# Patient Record
Sex: FEMALE | Race: WHITE | NOT HISPANIC OR LATINO | Employment: FULL TIME | URBAN - METROPOLITAN AREA
[De-identification: names, ages, dates, MRNs, and addresses within clinical notes are randomized per-mention and may not be internally consistent; named-entity substitution may affect disease eponyms.]

---
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
## 2019-11-23 NOTE — PROGRESS NOTES
Cardiac Electrophysiology Outpatient Consult Note            [facility]     Consult Note     Michell Lynch  [id number]  11/25/2019  [unfilled]  [unfilled]    Primary Care Physician: Ananth Vo MD    Referred By: Archana Walsh MD    Subjective     Chief Complaint:   Chief Complaint   Patient presents with   • Loss of Consciousness   • Dizziness     Problem list:    1. HTN  2. IBS    History of Present Illness:   Ms. Michell Lynch is a 19 y.o. female who presents to my electrophysiology clinic for evaluation of syncope.  She has had several episodes in the last 6 to 7 months.  She numbers them at approximately 8.  All of the episodes she knows she is going to lose consciousness before she does.  She is never had any injury.  She feels completely wiped out and groggy for several hours afterwards.    She denies nausea vomiting fevers or chills.  She does admit and endorses significant weight gain due to inactivity and also increased caloric intake.    She does not smoke    She has had no palpitations with any of her events..    Review of Systems:   Review of Systems:   Constitutional: No fevers or chills, no recent weight gain or weight loss or fatigue  Eyes: No visual loss, blurred vision, double vision, yellow sclerae.  ENT: No headaches, hearing loss, vertigo, congestion or sore throat.   Cardiovascular: Per HPI  Respiratory: No cough or wheezing, no sputum production, no hematemesis   Gastrointestinal: No abdominal pain, no nausea, vomiting, constipation, diarrhea, melena.   Genitourinary: No dysuria, hematuria or increased frequency.  Musculoskeletal:  No gait disturbance, weakness or joint pain or stiffness  Integumentary: No rashes, urticaria, ulcers or sores.   Neurological: No headache, dizziness, syncope, paralysis, ataxia, no prior CVA/TIA  Psychiatric: No anxiety, or depression  Endocrine: No diaphoresis, cold or heat intolerance. No polyuria or  "polydipsia.   Hematologic/Lymphatic: No anemia, abnormal bruising or bleeding. No history of DVT/PE.         Past Medical History:   Past Medical History:   Diagnosis Date   • Anxiety    • Anxiety    • Depressed    • Depression    • HPV in female    • Hypertension    • IBS (irritable bowel syndrome)    • IBS (irritable bowel syndrome)        Past Surgical History:   Past Surgical History:   Procedure Laterality Date   •  SECTION     • CHOLECYSTECTOMY         Family History:   Family History   Problem Relation Age of Onset   • Mitral valve prolapse Mother    • No Known Problems Father    • Anxiety disorder Sister    • Depression Sister    • Hypertension Other    • Anxiety disorder Sister    • Depression Sister    • Asthma Sister        Social History:   Social History     Socioeconomic History   • Marital status: Single     Spouse name: Not on file   • Number of children: Not on file   • Years of education: Not on file   • Highest education level: Not on file   Tobacco Use   • Smoking status: Current Every Day Smoker     Types: Cigarettes, Electronic Cigarette     Last attempt to quit: 2013     Years since quittin.0   • Smokeless tobacco: Never Used   • Tobacco comment: just smoking vape no cigarettes   Substance and Sexual Activity   • Alcohol use: Yes     Comment: seldom   • Drug use: No   • Sexual activity: Defer       Medications:   No current outpatient medications on file.    Allergies:   Allergies   Allergen Reactions   • Sulfur GI Intolerance       Objective     Physical Exam:  Vital Signs:   Vitals:    19 0910   BP: 124/88   BP Location: Left arm   Patient Position: Sitting   Pulse: 93   Weight: 122 kg (269 lb)   Height: 170.2 cm (67\")     GEN: Well nourished, well-developed, no acute distress  HEENT: Normocephalic, atraumatic, moist mucous membranes  NECK: Supple, no JVD, no thyromegaly, no lymphadenopathy  CARD: Regular rate and rhythm, normal S1 & S2 are present.  No murmur, gallop " or rubs are appreciated.  LUNGS: Clear to auscultation bilateraly, normal respiratory effort  ABDOMEN: Soft, nontender, normal bowel sounds  EXTREMITIES: No gross deformities, no clubbing, cyanosis.  Edema none.  SKIN: Warm, dry  NEURO: No focal deficits, alert and oriented x 3  PSYCHIATRIC: Normal affect and mood, appropriate use of semantics and logic.        Lab Results   Component Value Date    GLUCOSE 91 07/13/2017    GLUCOSE 91 07/13/2017    CALCIUM 9.9 07/13/2017    CALCIUM 9.7 07/13/2017     07/13/2017     07/13/2017    K 4.1 07/13/2017    K 4.0 07/13/2017    CO2 27.0 07/13/2017    CO2 24.0 07/13/2017     07/13/2017     (H) 07/13/2017    BUN 10 07/13/2017    BUN 11 07/13/2017    CREATININE 0.70 07/13/2017    CREATININE 0.70 07/13/2017    EGFRIFNONA 108 07/13/2017    EGFRIFNONA 108 07/13/2017    BCR 14.3 07/13/2017    BCR 15.7 07/13/2017    ANIONGAP 4.0 07/13/2017    ANIONGAP 7.0 07/13/2017     Lab Results   Component Value Date    WBC 8.45 07/13/2017    HGB 13.1 07/13/2017    HCT 39.3 07/13/2017    MCV 89.1 07/13/2017     07/13/2017     No results found for: INR, PROTIME  No results found for: TSH, Y8RABWZ, V3IZGTK, THYROIDAB    Cardiac Testing:  .all cardiac testing.    I personally viewed and interpreted the patient's EKG/Telemetry/lab data      ECG 12 Lead  Date/Time: 11/25/2019 9:40 AM  Performed by: Chapincito Madison DO  Authorized by: Chapincito Madison DO   Comparison: not compared with previous ECG   Rhythm: sinus rhythm    Clinical impression: normal ECG            Tobacco Cessation: N/A  Obstructive Sleep Apnea Screening: N/A    Assessment & Plan    This patient has classic vasovagal syncope.  She has a structurally normal heart by echocardiogram and normal ambulatory monitor during symptoms.  She has a normal EKG and a normal physical exam.  She has multiple clear stressors which have contributed to this including sleeping 3 to 4 hours per night, eating very infrequently,  chronically being dehydrated, never exercising, having a lot of home stressors including a significant other who works during the night and a 14-month-old at home.  She knows that she is significantly overweight.  She is motivated to lose weight.  She also has been diagnosed with systemic hypertension and this is undoubtedly related to her weight as well.    Regarding her vasovagal syncope I have encouraged her to think of herself as having a normal heart because indeed she does.  She requires no additional testing or work-up for this.  I have prescribed her the following lifestyle changes which will be critical for her improvement:    1.  Significant weight loss.    2.  Improve sleep hygiene.    3.  Smaller more frequent meals with less carbohydrates.    4.  Exercise every day.    5.  Decrease caffeine intake specifically soda and tea.    6.  Increase salt and water intake.    I will not schedule follow-up in my arrhythmia clinic.  She can follow-up with her primary care physician.    Obesity Counseling:    I discussed with the patient that they are obese.  We discussed that obesity is a significant risk factor for heart disease, hypertension, diabetes, other forms of health problems and indeed premature death.  We discussed that it is critical that the patient loose weight to minimize their risk of excess morbidity and mortality.  We further discussed various options regarding weight loss strategies including dietary caloric restriction, exercise, referral to a dietitian and possibly also bariatric surgical options   (which are appropriate for some but not all patients ). We spent over 10 minutes discussing weight loss options. They voiced a clear understanding of these medical facts.  They voice a clear understanding of my medical advice that they endeavor to loose weight as their obesity is adversely affecting their health.      Michell was seen today for loss of consciousness and dizziness.    Diagnoses and all  orders for this visit:    Syncope and collapse        Follow Up:     Thank you for allowing me to participate in the care of your patient. Please to not hesitate to contact me with additional questions or concerns.        Chapincito Madison DO, FACC, [facility]  Cardiac Electrophysiologist

## 2019-11-25 ENCOUNTER — CONSULT (OUTPATIENT)
Dept: CARDIOLOGY | Facility: CLINIC | Age: 19
End: 2019-11-25

## 2019-11-25 VITALS
HEIGHT: 67 IN | WEIGHT: 269 LBS | DIASTOLIC BLOOD PRESSURE: 88 MMHG | HEART RATE: 93 BPM | BODY MASS INDEX: 42.22 KG/M2 | SYSTOLIC BLOOD PRESSURE: 124 MMHG

## 2019-11-25 DIAGNOSIS — R55 SYNCOPE AND COLLAPSE: Primary | ICD-10-CM

## 2019-11-25 PROCEDURE — 93000 ELECTROCARDIOGRAM COMPLETE: CPT | Performed by: INTERNAL MEDICINE

## 2019-11-25 PROCEDURE — 99203 OFFICE O/P NEW LOW 30 MIN: CPT | Performed by: INTERNAL MEDICINE

## 2021-07-15 ENCOUNTER — HOSPITAL ENCOUNTER (EMERGENCY)
Facility: HOSPITAL | Age: 21
Discharge: HOME | End: 2021-07-15
Attending: EMERGENCY MEDICINE | Admitting: EMERGENCY MEDICINE

## 2021-07-15 ENCOUNTER — APPOINTMENT (OUTPATIENT)
Dept: CT IMAGING | Facility: HOSPITAL | Age: 21
End: 2021-07-15

## 2021-07-15 VITALS
OXYGEN SATURATION: 98 % | SYSTOLIC BLOOD PRESSURE: 125 MMHG | DIASTOLIC BLOOD PRESSURE: 97 MMHG | TEMPERATURE: 97.8 F | WEIGHT: 265 LBS | HEIGHT: 67 IN | BODY MASS INDEX: 41.59 KG/M2 | HEART RATE: 120 BPM | RESPIRATION RATE: 16 BRPM

## 2021-07-15 DIAGNOSIS — M54.2 NECK PAIN: Primary | ICD-10-CM

## 2021-07-15 PROCEDURE — 99282 EMERGENCY DEPT VISIT SF MDM: CPT

## 2021-07-15 PROCEDURE — 72125 CT NECK SPINE W/O DYE: CPT

## 2021-07-15 RX ORDER — CYCLOBENZAPRINE HCL 10 MG
10 TABLET ORAL 3 TIMES DAILY PRN
Qty: 21 TABLET | Refills: 0 | OUTPATIENT
Start: 2021-07-15 | End: 2022-10-28

## 2021-07-15 RX ORDER — ETODOLAC 200 MG/1
200 CAPSULE ORAL EVERY 8 HOURS
Qty: 15 CAPSULE | Refills: 0 | Status: SHIPPED | OUTPATIENT
Start: 2021-07-15 | End: 2022-11-16

## 2021-07-15 NOTE — ED PROVIDER NOTES
Subjective   Chief Complaint: Neck pain  History of Present Illness: 21-year-old female comes in for evaluation of above complaint.  She says she was getting up out of bed at 8:00 this morning using her left arm to push himself up and felt a pop and pain in her neck.  She complains of diffuse cervical pain.  Pain with any range of motion.  No numbness or tingling or weakness in her bilateral upper extremities.  Onset: 8:00 AM  Timing: Single inciting injury with ongoing symptoms  Exacerbating / Alleviating factors: Worse with any movement of the neck and palpation of the paraspinous musculature  Associated symptoms: None      Nurses Notes reviewed and agree, including vitals, allergies, social history and prior medical history.          Review of Systems   Constitutional: Negative.    HENT: Negative.    Eyes: Negative.    Respiratory: Negative.    Cardiovascular: Negative.    Gastrointestinal: Negative.    Genitourinary: Negative.    Musculoskeletal: Positive for neck pain.   Skin: Negative.    Neurological: Negative.    Psychiatric/Behavioral: Negative.        Past Medical History:   Diagnosis Date   • Anxiety    • Anxiety    • Depressed    • Depression    • HPV in female    • Hypertension    • IBS (irritable bowel syndrome)    • IBS (irritable bowel syndrome)        Allergies   Allergen Reactions   • Latex Itching     Skin redness and irritation   • Sulfur GI Intolerance       Past Surgical History:   Procedure Laterality Date   •  SECTION     • CHOLECYSTECTOMY         Family History   Problem Relation Age of Onset   • Mitral valve prolapse Mother    • No Known Problems Father    • Anxiety disorder Sister    • Depression Sister    • Hypertension Other    • Anxiety disorder Sister    • Depression Sister    • Asthma Sister        Social History     Socioeconomic History   • Marital status: Single     Spouse name: Not on file   • Number of children: Not on file   • Years of education: Not on file   • Highest  education level: Not on file   Tobacco Use   • Smoking status: Former Smoker     Types: Cigarettes     Quit date: 2013     Years since quittin.6   • Smokeless tobacco: Never Used   • Tobacco comment: started smoking again   Vaping Use   • Vaping Use: Former   Substance and Sexual Activity   • Alcohol use: Yes     Comment: seldom   • Drug use: No   • Sexual activity: Defer           Objective   Physical Exam  Vitals and nursing note reviewed.   Constitutional:       Appearance: Normal appearance.   HENT:      Head: Normocephalic and atraumatic.      Nose: Nose normal.   Eyes:      Extraocular Movements: Extraocular movements intact.   Cardiovascular:      Rate and Rhythm: Normal rate and regular rhythm.      Pulses: Normal pulses.   Pulmonary:      Effort: Pulmonary effort is normal.   Musculoskeletal:      Cervical back: Pain with movement present. No muscular tenderness.   Skin:     General: Skin is warm and dry.   Neurological:      General: No focal deficit present.      Mental Status: She is alert. Mental status is at baseline.      Motor: No weakness.   Psychiatric:         Mood and Affect: Mood normal.         Behavior: Behavior normal.         Procedures           ED Course                                           MDM    Final diagnoses:   Neck pain       ED Disposition  ED Disposition     ED Disposition Condition Comment    Discharge Stable           Ananth Vo MD  [address] [place] [address] [phone]      As needed    [facility] Emergency Department  [address] [facility] [address] [phone]    If symptoms worsen         Medication List      New Prescriptions    cyclobenzaprine 10 MG tablet  Commonly known as: FLEXERIL  Take 1 tablet by mouth 3 (Three) Times a Day As Needed for Muscle Spasms.     etodolac 200 MG capsule  Commonly known as: LODINE  Take 1 capsule by mouth Every 8 (Eight) Hours.           Where to Get Your Medications       These medications were sent to [facility] [address] [place] [address] - [address] [facility] - [phone]  - [phone]  [address] [facility] [address]    Phone: [phone]   · cyclobenzaprine 10 MG tablet  · etodolac 200 MG capsule          Javi Reeder PA-C  07/15/21 4201

## 2021-07-30 ENCOUNTER — HOSPITAL ENCOUNTER (EMERGENCY)
Facility: HOSPITAL | Age: 21
Discharge: LEFT BEFORE DISCHARGE | End: 2021-07-30

## 2021-07-30 ENCOUNTER — HOSPITAL ENCOUNTER (EMERGENCY)
Facility: HOSPITAL | Age: 21
Discharge: HOME | End: 2021-07-30
Attending: EMERGENCY MEDICINE | Admitting: EMERGENCY MEDICINE

## 2021-07-30 VITALS
HEART RATE: 74 BPM | DIASTOLIC BLOOD PRESSURE: 85 MMHG | OXYGEN SATURATION: 99 % | BODY MASS INDEX: 42.03 KG/M2 | HEIGHT: 67 IN | TEMPERATURE: 98 F | RESPIRATION RATE: 16 BRPM | WEIGHT: 267.8 LBS | SYSTOLIC BLOOD PRESSURE: 124 MMHG

## 2021-07-30 VITALS
SYSTOLIC BLOOD PRESSURE: 148 MMHG | RESPIRATION RATE: 18 BRPM | WEIGHT: 265 LBS | TEMPERATURE: 98.2 F | BODY MASS INDEX: 41.59 KG/M2 | DIASTOLIC BLOOD PRESSURE: 100 MMHG | HEART RATE: 99 BPM | OXYGEN SATURATION: 100 % | HEIGHT: 67 IN

## 2021-07-30 DIAGNOSIS — R30.0 DYSURIA: Primary | ICD-10-CM

## 2021-07-30 LAB
B-HCG UR QL: NEGATIVE
BILIRUB UR QL STRIP: NEGATIVE
CLARITY UR: CLEAR
COLOR UR: YELLOW
GLUCOSE UR STRIP-MCNC: NEGATIVE MG/DL
HGB UR QL STRIP.AUTO: NEGATIVE
KETONES UR QL STRIP: NEGATIVE
LEUKOCYTE ESTERASE UR QL STRIP.AUTO: NEGATIVE
NITRITE UR QL STRIP: NEGATIVE
PH UR STRIP.AUTO: 6 [PH] (ref 5–8)
PROT UR QL STRIP: NEGATIVE
SP GR UR STRIP: 1.02 (ref 1–1.03)
UROBILINOGEN UR QL STRIP: NORMAL

## 2021-07-30 PROCEDURE — 99211 OFF/OP EST MAY X REQ PHY/QHP: CPT

## 2021-07-30 PROCEDURE — 99283 EMERGENCY DEPT VISIT LOW MDM: CPT

## 2021-07-30 PROCEDURE — 81003 URINALYSIS AUTO W/O SCOPE: CPT | Performed by: PHYSICIAN ASSISTANT

## 2021-07-30 PROCEDURE — 81025 URINE PREGNANCY TEST: CPT | Performed by: PHYSICIAN ASSISTANT

## 2021-07-30 RX ORDER — PHENAZOPYRIDINE HYDROCHLORIDE 100 MG/1
100 TABLET, FILM COATED ORAL 3 TIMES DAILY PRN
Qty: 6 TABLET | Refills: 0 | OUTPATIENT
Start: 2021-07-30 | End: 2022-10-28

## 2022-12-07 ENCOUNTER — HOSPITAL ENCOUNTER (EMERGENCY)
Facility: HOSPITAL | Age: 22
Discharge: HOME | End: 2022-12-08
Attending: EMERGENCY MEDICINE | Admitting: EMERGENCY MEDICINE

## 2022-12-07 VITALS
HEIGHT: 68 IN | WEIGHT: 250 LBS | TEMPERATURE: 98.4 F | BODY MASS INDEX: 37.89 KG/M2 | OXYGEN SATURATION: 96 % | HEART RATE: 101 BPM | RESPIRATION RATE: 18 BRPM | DIASTOLIC BLOOD PRESSURE: 69 MMHG | SYSTOLIC BLOOD PRESSURE: 117 MMHG

## 2022-12-07 DIAGNOSIS — U07.1 COVID-19: Primary | ICD-10-CM

## 2022-12-07 LAB
FLUAV RNA RESP QL NAA+PROBE: NOT DETECTED
FLUBV RNA RESP QL NAA+PROBE: NOT DETECTED
SARS-COV-2 RNA RESP QL NAA+PROBE: DETECTED

## 2022-12-07 PROCEDURE — 87636 SARSCOV2 & INF A&B AMP PRB: CPT | Performed by: PHYSICIAN ASSISTANT

## 2022-12-07 PROCEDURE — 99283 EMERGENCY DEPT VISIT LOW MDM: CPT

## 2022-12-07 PROCEDURE — C9803 HOPD COVID-19 SPEC COLLECT: HCPCS

## 2022-12-08 NOTE — ED PROVIDER NOTES
Subjective  History of Present Illness:    Chief Complaint: Exposures no illness  History of Present Illness: Exposed to COVID recently, complaint of cough, congestion, bodyaches, chills, and fatigue.  Also complains of right ear fullness  Onset: Sudden onset  Duration: 1 day  Exacerbating / Alleviating factors: Worse with activity  Associated symptoms: Body aches      Nurses Notes reviewed and agree, including vitals, allergies, social history and prior medical history.     REVIEW OF SYSTEMS: All systems reviewed and not pertinent unless noted.    Review of Systems   Constitutional: Positive for chills and fever.   HENT: Positive for rhinorrhea.    Respiratory: Positive for cough.    Musculoskeletal: Positive for myalgias.   All other systems reviewed and are negative.      Past Medical History:   Diagnosis Date   • Anxiety    • Anxiety    • Depressed    • Depression    • HPV in female    • Hypertension    • IBS (irritable bowel syndrome)    • IBS (irritable bowel syndrome)        Allergies:    Adhesive tape, Latex, Sulfa antibiotics, and Elemental sulfur      Past Surgical History:   Procedure Laterality Date   •  SECTION     • CHOLECYSTECTOMY           Social History     Socioeconomic History   • Marital status: Single   Tobacco Use   • Smoking status: Some Days     Types: Cigarettes     Last attempt to quit: 2013     Years since quittin.0   • Smokeless tobacco: Never   • Tobacco comments:     started smoking again   Vaping Use   • Vaping Use: Every day   • Substances: Nicotine   • Devices: Disposable   Substance and Sexual Activity   • Alcohol use: Yes     Comment: seldom   • Drug use: No   • Sexual activity: Defer         Family History   Problem Relation Age of Onset   • Mitral valve prolapse Mother    • No Known Problems Father    • Anxiety disorder Sister    • Depression Sister    • Hypertension Other    • Anxiety disorder Sister    • Depression Sister    • Asthma Sister   "      Objective  Physical Exam:  /69 (BP Location: Left arm, Patient Position: Sitting)   Pulse 101   Temp 98.4 °F (36.9 °C) (Oral)   Resp 18   Ht 171.5 cm (67.5\")   Wt 113 kg (250 lb)   LMP 12/07/2022   SpO2 96%   BMI 38.58 kg/m²      Physical Exam  Vitals and nursing note reviewed.   Constitutional:       Appearance: She is well-developed.   HENT:      Head: Normocephalic and atraumatic.      Right Ear: Tympanic membrane normal.      Left Ear: Tympanic membrane normal.      Nose: Nose normal.      Mouth/Throat:      Mouth: Mucous membranes are moist.   Eyes:      Extraocular Movements: Extraocular movements intact.   Cardiovascular:      Rate and Rhythm: Normal rate and regular rhythm.   Pulmonary:      Effort: Pulmonary effort is normal.      Breath sounds: Normal breath sounds.   Abdominal:      Palpations: Abdomen is soft.   Musculoskeletal:         General: Normal range of motion.      Cervical back: Normal range of motion and neck supple.   Skin:     General: Skin is warm and dry.   Neurological:      Mental Status: She is alert and oriented to person, place, and time.      Deep Tendon Reflexes: Reflexes are normal and symmetric.           Procedures    ED Course:         Lab Results (last 24 hours)     Procedure Component Value Units Date/Time    COVID-19 and FLU A/B PCR - Swab, Nasopharynx [[id number]]  (Abnormal) Collected: 12/07/22 2301    Specimen: Swab from Nasopharynx Updated: 12/07/22 2330     COVID19 Detected     Influenza A PCR Not Detected     Influenza B PCR Not Detected    Narrative:      Fact sheet for providers: https://www.fda.gov/media/696207/download    Fact sheet for patients: https://www.fda.gov/media/492986/download    Test performed by PCR.  Influenza A and Influenza B negative results should be considered presumptive in samples that have a positive SARS-CoV-2 result.    Competitive inhibition studies showed that SARS-CoV-2 virus, when present at concentrations above 3.6E+04 " copies/mL, can inhibit the detection and amplification of influenza A and influenza B virus RNA if present at or below 1.8E+02 copies/mL or 4.9E+02 copies/mL, respectively, and may lead to false negative influenza virus results. If co-infection with influenza A or influenza B virus is suspected in samples with a positive SARS-CoV-2 result, the sample should be re-tested with another FDA cleared, approved, or authorized influenza test, if influenza virus detection would change clinical management.           No radiology results from the last 24 hrs       MDM  Number of Diagnoses or Management Options  COVID-19: new and requires workup     Amount and/or Complexity of Data Reviewed  Clinical lab tests: reviewed    Risk of Complications, Morbidity, and/or Mortality  Presenting problems: low  Management options: low    Patient Progress  Patient progress: stable        Final diagnoses:   COVID-19        Ivan Topete Jr., PAFOREIGN  12/08/22 0005